# Patient Record
Sex: FEMALE | Race: AMERICAN INDIAN OR ALASKA NATIVE | Employment: FULL TIME | URBAN - METROPOLITAN AREA
[De-identification: names, ages, dates, MRNs, and addresses within clinical notes are randomized per-mention and may not be internally consistent; named-entity substitution may affect disease eponyms.]

---

## 2024-07-22 ENCOUNTER — HOSPITAL ENCOUNTER (EMERGENCY)
Age: 29
Discharge: HOME | End: 2024-07-22
Attending: EMERGENCY MEDICINE
Payer: COMMERCIAL

## 2024-07-22 ENCOUNTER — APPOINTMENT (OUTPATIENT)
Dept: MRI IMAGING | Age: 29
End: 2024-07-22
Payer: COMMERCIAL

## 2024-07-22 VITALS
RESPIRATION RATE: 18 BRPM | HEIGHT: 62 IN | TEMPERATURE: 98.3 F | HEART RATE: 84 BPM | DIASTOLIC BLOOD PRESSURE: 76 MMHG | OXYGEN SATURATION: 98 % | SYSTOLIC BLOOD PRESSURE: 122 MMHG | WEIGHT: 252 LBS | BODY MASS INDEX: 46.38 KG/M2

## 2024-07-22 DIAGNOSIS — M51.26 HERNIATED LUMBAR INTERVERTEBRAL DISC: Primary | ICD-10-CM

## 2024-07-22 DIAGNOSIS — M54.17 LUMBOSACRAL RADICULOPATHY: ICD-10-CM

## 2024-07-22 LAB
ALBUMIN SERPL-MCNC: 4 G/DL (ref 3.5–4.6)
ALP SERPL-CCNC: 46 U/L (ref 40–130)
ALT SERPL-CCNC: 15 U/L (ref 0–33)
ANION GAP SERPL CALCULATED.3IONS-SCNC: 8 MEQ/L (ref 9–15)
AST SERPL-CCNC: 16 U/L (ref 0–35)
BASOPHILS # BLD: 0 K/UL (ref 0–0.2)
BASOPHILS NFR BLD: 0.2 %
BILIRUB SERPL-MCNC: 0.5 MG/DL (ref 0.2–0.7)
BUN SERPL-MCNC: 11 MG/DL (ref 6–20)
CALCIUM SERPL-MCNC: 9 MG/DL (ref 8.5–9.9)
CHLORIDE SERPL-SCNC: 104 MEQ/L (ref 95–107)
CO2 SERPL-SCNC: 25 MEQ/L (ref 20–31)
CREAT SERPL-MCNC: 0.57 MG/DL (ref 0.5–0.9)
CRP SERPL HS-MCNC: <3 MG/L (ref 0–5)
EOSINOPHIL # BLD: 0.1 K/UL (ref 0–0.7)
EOSINOPHIL NFR BLD: 1 %
ERYTHROCYTE [DISTWIDTH] IN BLOOD BY AUTOMATED COUNT: 12 % (ref 11.5–14.5)
ERYTHROCYTE [SEDIMENTATION RATE] IN BLOOD BY WESTERGREN METHOD: 12 MM (ref 0–20)
GLOBULIN SER CALC-MCNC: 2.8 G/DL (ref 2.3–3.5)
GLUCOSE SERPL-MCNC: 87 MG/DL (ref 70–99)
HCG, URINE, POC: NEGATIVE
HCT VFR BLD AUTO: 42.3 % (ref 37–47)
HGB BLD-MCNC: 13.5 G/DL (ref 12–16)
LYMPHOCYTES # BLD: 1.4 K/UL (ref 1–4.8)
LYMPHOCYTES NFR BLD: 27.7 %
Lab: NORMAL
MCH RBC QN AUTO: 29 PG (ref 27–31.3)
MCHC RBC AUTO-ENTMCNC: 31.9 % (ref 33–37)
MCV RBC AUTO: 90.8 FL (ref 79.4–94.8)
MONOCYTES # BLD: 0.4 K/UL (ref 0.2–0.8)
MONOCYTES NFR BLD: 7.1 %
NEGATIVE QC PASS/FAIL: NORMAL
NEUTROPHILS # BLD: 3.2 K/UL (ref 1.4–6.5)
NEUTS SEG NFR BLD: 63.8 %
PLATELET # BLD AUTO: 328 K/UL (ref 130–400)
POSITIVE QC PASS/FAIL: NORMAL
POTASSIUM SERPL-SCNC: 4.2 MEQ/L (ref 3.4–4.9)
PROT SERPL-MCNC: 6.8 G/DL (ref 6.3–8)
RBC # BLD AUTO: 4.66 M/UL (ref 4.2–5.4)
SODIUM SERPL-SCNC: 137 MEQ/L (ref 135–144)
WBC # BLD AUTO: 5.1 K/UL (ref 4.8–10.8)

## 2024-07-22 PROCEDURE — 86140 C-REACTIVE PROTEIN: CPT

## 2024-07-22 PROCEDURE — 85652 RBC SED RATE AUTOMATED: CPT

## 2024-07-22 PROCEDURE — 80053 COMPREHEN METABOLIC PANEL: CPT

## 2024-07-22 PROCEDURE — 99284 EMERGENCY DEPT VISIT MOD MDM: CPT

## 2024-07-22 PROCEDURE — 85025 COMPLETE CBC W/AUTO DIFF WBC: CPT

## 2024-07-22 PROCEDURE — 6360000002 HC RX W HCPCS: Performed by: EMERGENCY MEDICINE

## 2024-07-22 PROCEDURE — 96374 THER/PROPH/DIAG INJ IV PUSH: CPT

## 2024-07-22 PROCEDURE — 36415 COLL VENOUS BLD VENIPUNCTURE: CPT

## 2024-07-22 PROCEDURE — 72148 MRI LUMBAR SPINE W/O DYE: CPT

## 2024-07-22 RX ORDER — PREDNISONE 10 MG/1
60 TABLET ORAL DAILY
Qty: 30 TABLET | Refills: 0 | Status: SHIPPED | OUTPATIENT
Start: 2024-07-22 | End: 2024-07-27

## 2024-07-22 RX ORDER — OXYCODONE HYDROCHLORIDE AND ACETAMINOPHEN 5; 325 MG/1; MG/1
1 TABLET ORAL EVERY 4 HOURS PRN
Qty: 20 TABLET | Refills: 0 | Status: SHIPPED | OUTPATIENT
Start: 2024-07-22 | End: 2024-07-25

## 2024-07-22 RX ORDER — DEXAMETHASONE SODIUM PHOSPHATE 10 MG/ML
10 INJECTION, SOLUTION INTRAMUSCULAR; INTRAVENOUS ONCE
Status: COMPLETED | OUTPATIENT
Start: 2024-07-22 | End: 2024-07-22

## 2024-07-22 RX ADMIN — DEXAMETHASONE SODIUM PHOSPHATE 10 MG: 10 INJECTION, SOLUTION INTRAMUSCULAR; INTRAVENOUS at 13:48

## 2024-07-22 NOTE — ED TRIAGE NOTES
Pt has been seen multiple times for leg numbness and pain. Pt states at this time numbness is entire left foot up to knee. Pt states she is not getting relief with ibuprofen.

## 2024-07-22 NOTE — DISCHARGE INSTRUCTIONS
For any worsening of your condition such as loss of bowel or bladder control numbness or tingling around the vagina or anus or weakness in the left leg or dragging your foot or any other worsening of any of your complaints return immediately to the ER or call 911 for more open treatment.

## 2024-07-22 NOTE — ED PROVIDER NOTES
Performed by ED Physician - none    LABS:  Labs Reviewed   CBC WITH AUTO DIFFERENTIAL - Abnormal; Notable for the following components:       Result Value    MCHC 31.9 (*)     All other components within normal limits   COMPREHENSIVE METABOLIC PANEL - Abnormal; Notable for the following components:    Anion Gap 8 (*)     All other components within normal limits   SEDIMENTATION RATE   C-REACTIVE PROTEIN   POC PREGNANCY UR-QUAL       All other labs were within normal range or not returned as of this dictation.    EMERGENCY DEPARTMENT COURSE and DIFFERENTIAL DIAGNOSIS/MDM:   Vitals:    Vitals:    07/22/24 1302 07/22/24 1312 07/22/24 1332 07/22/24 1337   BP: 123/72  122/76    Pulse:       Resp:       Temp:       TempSrc:       SpO2:  97%  98%   Weight:       Height:               MDM     Amount and/or Complexity of Data Reviewed  Clinical lab tests: reviewed  Tests in the radiology section of CPT®: reviewed  Tests in the medicine section of CPT®: reviewed  Discuss the patient with other providers: yes  Independent visualization of images, tracings, or specimens: yes    I had the patient walking in the room there was no foot drop or weakness noted.  I did talk to Dr. Leo the neurosurgeon and he evaluated the MRI himself and while there was a large broad-based disc herniation given the patient's clinical presentation with no focal neurological deficits it was felt the patient could be trialed as an outpatient with steroids and pain medication.  I discussed all of this with the patient and all were in agreement with the outpatient management and treatment plan.        CRITICAL CARE TIME   Total Critical Care time was  minutes, excluding separately reportableprocedures.  There was a high probability of clinicallysignificant/life threatening deterioration in the patient's condition which required my urgent intervention.      CONSULTS:  None    PROCEDURES:  Unless otherwise noted below, none     Procedures    FINAL

## 2024-07-30 ENCOUNTER — TELEPHONE (OUTPATIENT)
Age: 29
End: 2024-07-30

## 2024-07-30 NOTE — TELEPHONE ENCOUNTER
Patient LVM to inquire about sooner appt. Patient has been added to the wait list in the even a sooner appt is available.

## 2024-08-05 ENCOUNTER — OFFICE VISIT (OUTPATIENT)
Age: 29
End: 2024-08-05
Payer: COMMERCIAL

## 2024-08-05 VITALS — BODY MASS INDEX: 46.93 KG/M2 | TEMPERATURE: 97 F | HEIGHT: 62 IN | WEIGHT: 255 LBS

## 2024-08-05 DIAGNOSIS — M51.26 HERNIATED NUCLEUS PULPOSUS, LUMBAR: ICD-10-CM

## 2024-08-05 DIAGNOSIS — M48.062 SPINAL STENOSIS OF LUMBAR REGION WITH NEUROGENIC CLAUDICATION: Primary | ICD-10-CM

## 2024-08-05 PROCEDURE — 99203 OFFICE O/P NEW LOW 30 MIN: CPT | Performed by: NEUROLOGICAL SURGERY

## 2024-08-05 RX ORDER — METHYLPREDNISOLONE 4 MG/1
TABLET ORAL
Qty: 1 KIT | Refills: 0 | Status: SHIPPED | OUTPATIENT
Start: 2024-08-05 | End: 2024-08-11

## 2024-08-05 RX ORDER — IBUPROFEN 800 MG/1
800 TABLET ORAL 3 TIMES DAILY PRN
COMMUNITY
Start: 2024-07-20 | End: 2024-08-08

## 2024-08-05 RX ORDER — CYCLOBENZAPRINE HCL 5 MG
5 TABLET ORAL EVERY 8 HOURS
COMMUNITY
Start: 2024-07-16 | End: 2024-08-08

## 2024-08-05 ASSESSMENT — ENCOUNTER SYMPTOMS
ABDOMINAL DISTENTION: 0
BACK PAIN: 1
SHORTNESS OF BREATH: 0
EYE PAIN: 0
COUGH: 0
TROUBLE SWALLOWING: 0
ABDOMINAL PAIN: 0

## 2024-08-05 NOTE — PROGRESS NOTES
NEUROSURGERY CONSULT NOTE      Patient Name: Sonia Triana  Patient : 1995  MRN: [id number]       PCP: No primary care provider on file.        History of Present Ilness: 29 y.o. presents in neurosurgical consultation from Dr. Dunn for evaluation lumbar HNP. Started havving left hip pain and BLE pain to knees about 2-3 weeks ago.  This has been associated with some weakness in the legs.  She went to ED and was given NSAID's and muscle relaxants. Then started having numbness from left knee down to foot.  She went to the ED a second time and was given a steroid pack.  She does complain of some weakness both legs, this has had some improvement. Now the pain is gone in legs, numbness and weakness has improved. Worst c/o currently is LBP. No B/B incontinence.  She does tell me she has been working on weight loss and has lost a moderate amount of weight in the last year.    Chief Complaint   Patient presents with    Back Pain          Conservative Treatments:  Physical Therapy: No  NSAID's: Yes  Narcotics: No  Muscle relaxants: Yes  Epidural injections: No      Past Medical History:    No past medical history on file.    Past Surgical History:    No past surgical history on file.    Home Medications:   Prior to Admission medications    Medication Sig Start Date End Date Taking? Authorizing Provider   cyclobenzaprine (FLEXERIL) 5 MG tablet Take 1 tablet by mouth every 8 (eight) hours 24  Yes Provider, MD Long   ibuprofen (ADVIL;MOTRIN) 800 MG tablet Take 1 tablet by mouth 3 times daily as needed for Pain 24  Yes Provider, MD Long           Allergies:   Allergies   Allergen Reactions    Cefdinir Hives and Nausea And Vomiting       Social History:      TOBACCO:   reports that she has never smoked. She has never used smokeless tobacco.  ETOH:   reports no history of alcohol use.  RECREATIONAL DRUG USE:   Social History     Substance and Sexual Activity   Drug Use Never       Family History:

## 2024-08-08 ENCOUNTER — HOSPITAL ENCOUNTER (OUTPATIENT)
Dept: PHYSICAL THERAPY | Age: 29
Setting detail: THERAPIES SERIES
Discharge: HOME | End: 2024-08-08
Attending: NEUROLOGICAL SURGERY
Payer: COMMERCIAL

## 2024-08-08 ENCOUNTER — INITIAL CONSULT (OUTPATIENT)
Dept: PAIN MANAGEMENT | Age: 29
End: 2024-08-08
Payer: COMMERCIAL

## 2024-08-08 VITALS — TEMPERATURE: 97.2 F | HEIGHT: 62 IN | BODY MASS INDEX: 46.93 KG/M2 | WEIGHT: 255 LBS

## 2024-08-08 DIAGNOSIS — M54.16 LUMBAR RADICULOPATHY: Primary | ICD-10-CM

## 2024-08-08 PROCEDURE — 97110 THERAPEUTIC EXERCISES: CPT

## 2024-08-08 PROCEDURE — 99204 OFFICE O/P NEW MOD 45 MIN: CPT | Performed by: PAIN MEDICINE

## 2024-08-08 PROCEDURE — 97162 PT EVAL MOD COMPLEX 30 MIN: CPT

## 2024-08-08 NOTE — PROGRESS NOTES
[facility]  [facility]  P: [phone]  F: [phone]        oSnia Triana  (1995)    8/8/2024    Subjective:     Sonia Triana is 29 y.o. female who complains today of:     Chief Complaint   Patient presents with    Back Pain     Patient here today for initial consultation requested by Dr. Leo.  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  She went to the emergency room eventually had an MRI which shows significant disc herniation toward the left at L4-5 with significant stenosis.  She saw Dr. Leo who opted for conservative treatment based on risks and benefit ratio in regards to patient's pathology and weight.  He would like to try conservative treatment.  Patient is on steroids they help a little bit she tried muscle relaxers.  Pain is in low back going down the left leg with numbness some weakness.  Patient has not had back surgery.  She is not diabetic she is not on blood thinners.  She does not smoke.  Standing is the worst laying is the best.  The pain affects her quality life.    Assessment: L4-5 disc herniation with significant radiculopathy and stenosis    Plan: We discussed options.  I would like to authorize left L4-5 transforaminal epidural injection under fluoroscopic guidance.  Patient has failed consider treatment.  She has seen neurosurgery.  She is can start therapy.  OARRS is reviewed.  She does not need any narcotics at this point she is on steroids.  Questions answered chart reviewed she is in agreement with plan.  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    Allergies:  Cefdinir    No past medical history on file.  No past surgical history on file.  No family history on file.  Social History     Socioeconomic History    Marital

## 2024-08-08 NOTE — PLAN OF CARE
Physical Therapy Evaluation/Plan of Care   [facility]  [address]  [facility] [address]  Dept: [phone]  Dept Fax: [phone]  Loc: [phone]    Physical Therapy: Initial Evaluation    General Information    Patient: Sonia Triana (29 y.o.     female)   Examination Date: 2024   :  1995 ;    Confirmed: Yes MRN: [id number]  CSN: [id number]   Insurance: Payor: BC / Plan: BCBS - [place] PPO / Product Type: *No Product type* /   Insurance ID: [id number] - ([facility])  PT Insurance Information: [facility] Secondary Insurance (if applicable):     Referring Physician: Kevan Leo MD       Visits to Date/Visits Approved:     No Show/Cancelled Appts: 0      Medical Diagnosis: Spinal stenosis of lumbar region with neurogenic claudication [M48.062] Spinal stenosis of lumbar region with neurogenic claudication  Diagnosis: Spinal stenosis of lumbar region with neurogenic claudication   Treatment Diagnosis: back pain, Rt LE radiating pain, postural asymmetry, impaired functional mobility      SUBJECTIVE:     Onset date:   Onset Date: 07/15/24    Subjective/ Mechanism of Injury:   Subjective: Pt reports sudden onset of severe back pain and Lt LE radiating pain while dancing in show at [employer]. Pt states went to ER and received muscle relaxer with no sig improvement. Continued to work with progressive worsening of symptoms to the point of numbness Lt LE distal to knee. Pt went back to ER and referred to neuro and taking anti-inflammatories. Continued worsening of symptoms with working. Pt went to different ER and MRI revealed large central disc extrusion at L4/L5 with severe central canal stenosis. Given pain meds, steroids with some relief. Pt now reports numbness has improved, but persists lateral lower leg into foot and toes. 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 Waiting

## 2024-08-12 ENCOUNTER — TELEPHONE (OUTPATIENT)
Dept: PAIN MANAGEMENT | Age: 29
End: 2024-08-12

## 2024-08-12 NOTE — TELEPHONE ENCOUNTER
[facility] FOR PT TO CALL BACK AND SCHEDULE     LEFT L4-5 TRANSFORAMINAL      AUTH APPROVED FROM 8/13/24-9/11/24  REFERRAL # [id number]           TO BE SCHEDULED WITH DR. BROWN

## 2024-08-12 NOTE — TELEPHONE ENCOUNTER
LEFT L4-5 TRANSFORAMINAL     AUTH APPROVED FROM 8/13/24-9/11/24  REFERRAL # [id number]  OK to schedule procedure approved as above.   Please note sides/levels approved and date range.   (If applicable, sides/levels approved may differ from those ordered)    TO BE SCHEDULED WITH

## 2024-08-13 ENCOUNTER — HOSPITAL ENCOUNTER (OUTPATIENT)
Dept: PHYSICAL THERAPY | Age: 29
Setting detail: THERAPIES SERIES
Discharge: HOME | End: 2024-08-13
Attending: NEUROLOGICAL SURGERY
Payer: COMMERCIAL

## 2024-08-13 PROCEDURE — G0283 ELEC STIM OTHER THAN WOUND: HCPCS

## 2024-08-13 PROCEDURE — 97110 THERAPEUTIC EXERCISES: CPT

## 2024-08-13 ASSESSMENT — PAIN DESCRIPTION - DESCRIPTORS: DESCRIPTORS: ACHING

## 2024-08-13 ASSESSMENT — PAIN DESCRIPTION - ORIENTATION: ORIENTATION: LEFT;RIGHT

## 2024-08-13 ASSESSMENT — PAIN DESCRIPTION - LOCATION: LOCATION: BACK;HIP

## 2024-08-13 ASSESSMENT — PAIN SCALES - GENERAL: PAINLEVEL_OUTOF10: 5

## 2024-08-13 NOTE — PROGRESS NOTES
[facility]  Outpatient Physical Therapy    Treatment Note        Date: 2024  Patient: Sonia Triana  : 1995   Confirmed: Yes  MRN: [id number]  Referring Provider: eKvan Leo MD   Secondary Referring Provider (If applicable):     Medical Diagnosis: Spinal stenosis of lumbar region with neurogenic claudication [M48.062]    Treatment Diagnosis: back pain, Rt LE radiating pain, postural asymmetry, impaired functional mobility    Visit Information:  Insurance: Payor: [facility] / Plan: BCBS - [place] PPO / Product Type: *No Product type* /   PT Visit Information  Onset Date: 07/15/24  PT Insurance Information: BCBS  Total # of Visits Approved: 25  Total # of Visits to Date: 2  No Show: 0  Canceled Appointment: 0  Progress Note Counter:     Subjective Information:  Subjective: Pt reports feeling better over past week.  Pt reports injections scheduled for Aug 19th.  HEP Compliance:  [x] Good [] Fair [] Poor [] Reports not doing due to:    Pain Screening  Patient Currently in Pain: Yes  Pain Assessment: 0-10  Pain Level: 5  Pain Location: Back, Hip  Pain Orientation: Left, Right  Pain Descriptors: Aching    Treatment:  Exercises:  Exercises  Exercise 1: LTR x10  Exercise 2: supine pelvic tilt 5 sec/ 10  Exercise 3: diaphragmatic breathing x5 with good technique in supine  Exercise 4: clamshell, sidelying hip abd x10  Exercise 5: core strength: iso TA 5 sec/10, iso TA w/ march x10  Exercise 6: bridges x10  Exercise 7: * DLS  Exercise 8: Nu step L1 x5 min  Exercise 9: hams str 3/30 sec  Exercise 10: * lumbar ROM  Exercise 11: prone lay 2 min  Exercise 12: * sink exs  Exercise 20: HEP: HS stretch  Treatment Reasoning  Limitations addressed: Mobility, Strength, Flexibility    Manual:   Manual Therapy  Manual Traction: * leg pulls  Soft Tissue Mobilizaton: STM to lumbar paraspinals 5 min       Modalities:  Moist Heat (CPT 47650)  Patient Position: Prone  Moist heat location: Low

## 2024-08-15 ENCOUNTER — HOSPITAL ENCOUNTER (OUTPATIENT)
Dept: PHYSICAL THERAPY | Age: 29
Setting detail: THERAPIES SERIES
Discharge: HOME | End: 2024-08-15
Attending: NEUROLOGICAL SURGERY
Payer: COMMERCIAL

## 2024-08-15 PROCEDURE — 97110 THERAPEUTIC EXERCISES: CPT

## 2024-08-15 PROCEDURE — G0283 ELEC STIM OTHER THAN WOUND: HCPCS

## 2024-08-15 ASSESSMENT — PAIN DESCRIPTION - DESCRIPTORS: DESCRIPTORS: SORE

## 2024-08-15 ASSESSMENT — PAIN DESCRIPTION - ORIENTATION: ORIENTATION: LEFT;RIGHT

## 2024-08-15 ASSESSMENT — PAIN SCALES - GENERAL: PAINLEVEL_OUTOF10: 4

## 2024-08-15 ASSESSMENT — PAIN DESCRIPTION - LOCATION: LOCATION: BACK;HIP

## 2024-08-15 NOTE — PROGRESS NOTES
[facility]  Outpatient Physical Therapy    Treatment Note        Date: 8/15/2024  Patient: Sonia Triana  : 1995   Confirmed: Yes  MRN: [id number]  Referring Provider: Kevan Leo MD   Secondary Referring Provider (If applicable):     Medical Diagnosis: Spinal stenosis of lumbar region with neurogenic claudication [M48.062]    Treatment Diagnosis: back pain, Rt LE radiating pain, postural asymmetry, impaired functional mobility    Visit Information:  Insurance: Payor: [facility] / Plan: BCBS - [place] PPO / Product Type: *No Product type* /   PT Visit Information  Onset Date: 07/15/24  PT Insurance Information: BCBS  Total # of Visits Approved: 25  Total # of Visits to Date: 3  No Show: 0  Canceled Appointment: 0  Progress Note Counter: 3/12    Subjective Information:  Subjective: 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  Pt reports no increase in LBP while doing shows.  HEP Compliance:  [x] Good [] Fair [] Poor [] Reports not doing due to:    Pain Screening  Patient Currently in Pain: Yes  Pain Assessment: 0-10  Pain Level: 4  Pain Location: Back, Hip  Pain Orientation: Left, Right  Pain Descriptors: Sore    Treatment:  Exercises:  Exercises  Exercise 1: LTR x10  Exercise 2: seated pelvic tilts x10  Exercise 3: SLR x10 (pain with Rt LE)  Exercise 5: core strength: walkouts, bent knee fallout, dead bug x10  Exercise 6: bridges x10  Exercise 7: * DLS  Exercise 8: Nu step L1 x5 min  Exercise 9: hams str 3/30 sec  Exercise 10: * lumbar ROM  Exercise 11: prone prop x2 min  Exercise 12: * sink exs  Exercise 20: HEP: brindge, march, bent knee fallout  Treatment Reasoning  Limitations addressed: Mobility, Strength, Flexibility    Manual:   Manual Therapy  Manual Traction: * leg pulls  Soft Tissue Mobilizaton: STM to lumbar paraspinals 5 min       Modalities:  Moist Heat (CPT 91146)  Patient Position: Prone  Moist heat location: Low

## 2024-08-19 ENCOUNTER — PROCEDURE VISIT (OUTPATIENT)
Age: 29
End: 2024-08-19
Payer: COMMERCIAL

## 2024-08-19 ENCOUNTER — APPOINTMENT (OUTPATIENT)
Dept: PHYSICAL THERAPY | Age: 29
End: 2024-08-19
Attending: NEUROLOGICAL SURGERY
Payer: COMMERCIAL

## 2024-08-19 VITALS
SYSTOLIC BLOOD PRESSURE: 104 MMHG | HEIGHT: 62 IN | BODY MASS INDEX: 46.93 KG/M2 | OXYGEN SATURATION: 99 % | WEIGHT: 255 LBS | TEMPERATURE: 98.1 F | HEART RATE: 87 BPM | DIASTOLIC BLOOD PRESSURE: 70 MMHG

## 2024-08-19 DIAGNOSIS — M54.16 LUMBAR RADICULOPATHY: Primary | ICD-10-CM

## 2024-08-19 PROCEDURE — 64483 NJX AA&/STRD TFRM EPI L/S 1: CPT | Performed by: PAIN MEDICINE

## 2024-08-19 RX ORDER — DEXAMETHASONE SODIUM PHOSPHATE 10 MG/ML
10 INJECTION, SOLUTION INTRAMUSCULAR; INTRAVENOUS ONCE
Status: COMPLETED | OUTPATIENT
Start: 2024-08-19 | End: 2024-08-19

## 2024-08-19 RX ADMIN — DEXAMETHASONE SODIUM PHOSPHATE 10 MG: 10 INJECTION, SOLUTION INTRAMUSCULAR; INTRAVENOUS at 09:33

## 2024-08-19 NOTE — PROGRESS NOTES
[employer]  Spine Surgery  Advanced Pain Management      Patient Name: Sonia Triana : 1995  Date: 2024   Physician: HUONG BROWN DO      Sonia Triana  is here today for interventional pain management.    Preoperatively, the patient presents with radicular pain in the affected area as per history and exam. Patient has failed NSAIDs, PT, and conservative treatment. Patient has significant psychological and functional impairment due to this condition.  Standard ASIPP guidelines were followed and sterile technique used.  Area was cleaned with Betadine x3.  Informed consent was obtained.  Fluoroscopic guidance was used for this procedure.    TRANSFORAMINAL EPIDURAL  There was appropriate spread of contrast in the anterior epidural space and around the exiting nerve root. The 6 o’clock position of the pedicle was identified. Multiple views of fluoroscopy including lateral were used to confirm accurate needle placement of depth. Live fluoroscopy was used when injecting contrast to ensure no subdural or vascular spread. In total, approximately 5 mg of Dexamethasone and 1.0 ml of 0.9cc of normal saline was injected slowly without difficulty.This procedure was 60% more difficult and required 60% more work secondary to the patient's habitus. The patient has a BMI of 47 and has comorbidities of severe obesity. This required increased work for safe and proper positioning upon the fluoroscopy table, increased needle passes for safe and appropriate needle placement, and increased fluoroscopy time and radiation exposure for proper visualization.        Patient tolerated the procedure well, no obvious complications occurred during the procedure.  Patient was appropriately monitored and discharged home in stable condition with their usual motor strength. Post Op instructions were given to patient. Patient will resume blood thinners after procedure if they stopped them before procedure. Relevant imaging

## 2024-08-20 ENCOUNTER — HOSPITAL ENCOUNTER (OUTPATIENT)
Dept: PHYSICAL THERAPY | Age: 29
Setting detail: THERAPIES SERIES
Discharge: HOME | End: 2024-08-20
Attending: NEUROLOGICAL SURGERY
Payer: COMMERCIAL

## 2024-08-20 PROCEDURE — 97110 THERAPEUTIC EXERCISES: CPT

## 2024-08-20 ASSESSMENT — PAIN DESCRIPTION - PAIN TYPE: TYPE: ACUTE PAIN

## 2024-08-20 ASSESSMENT — PAIN DESCRIPTION - ORIENTATION: ORIENTATION: LOWER;RIGHT

## 2024-08-20 ASSESSMENT — PAIN DESCRIPTION - LOCATION: LOCATION: BACK;LEG

## 2024-08-20 ASSESSMENT — PAIN SCALES - GENERAL: PAINLEVEL_OUTOF10: 3

## 2024-08-20 NOTE — PROGRESS NOTES
[facility]  Outpatient Physical Therapy    Treatment Note        Date: 2024  Patient: Sonia Traina  : 1995   Confirmed: Yes  MRN: [id number]  Referring Provider: Kevan Leo MD   Secondary Referring Provider (If applicable):     Medical Diagnosis: Spinal stenosis of lumbar region with neurogenic claudication [M48.062]    Treatment Diagnosis: back pain, Rt LE radiating pain, postural asymmetry, impaired functional mobility    Visit Information:  Insurance: Payor: [facility] / Plan: BCBS - [place] PPO / Product Type: *No Product type* /   PT Visit Information  Onset Date: 07/15/24  PT Insurance Information: BCBS  Total # of Visits Approved: 25  Total # of Visits to Date: 4  No Show: 0  Canceled Appointment: 0  Progress Note Counter:     Subjective Information:  Subjective: Patient reports she received low back injections yesterday. Feels the intensity of numbness and tingling in her decreased. C/o HA and tiredness since procedure yesterday.  HEP Compliance:  [x] Good [] Fair [] Poor [] Reports not doing due to:    Treatment:  Exercises:  Exercises  Exercise 1: LTR x10  Exercise 2: seated pelvic tilts w ay on Pball  x10  Exercise 5: core strength: walkouts, bent knee fallout, dead bug x15  Exercise 6: bridges x15  Exercise 9: hams str 3/30 sec seated, gastroc stretch 3x30\"  Exercise 11: prone prop x3 min  Exercise 20: HEP: bridge, march, bent knee fallout    Assessment:   Body Structures, Functions, Activity Limitations Requiring Skilled Therapeutic Intervention: Decreased functional mobility , Decreased ROM, Decreased strength, Decreased sensation, Increased pain, Decreased posture  Assessment: Continued current POc with focus on core stability and lumbar mobility to decreased pain with work duties. Patient initially reports increased radicular pain with prone prop however symptoms decreased after 30 seconds in position. Held manual/modalities this date to allow patient

## 2024-08-21 ENCOUNTER — HOSPITAL ENCOUNTER (OUTPATIENT)
Dept: PHYSICAL THERAPY | Age: 29
Setting detail: THERAPIES SERIES
Discharge: HOME | End: 2024-08-21
Attending: NEUROLOGICAL SURGERY
Payer: COMMERCIAL

## 2024-08-21 PROCEDURE — G0283 ELEC STIM OTHER THAN WOUND: HCPCS

## 2024-08-21 PROCEDURE — 97110 THERAPEUTIC EXERCISES: CPT

## 2024-08-21 ASSESSMENT — PAIN DESCRIPTION - ORIENTATION: ORIENTATION: LOWER

## 2024-08-21 ASSESSMENT — PAIN DESCRIPTION - PAIN TYPE: TYPE: ACUTE PAIN

## 2024-08-21 ASSESSMENT — PAIN SCALES - GENERAL: PAINLEVEL_OUTOF10: 2

## 2024-08-21 ASSESSMENT — PAIN DESCRIPTION - LOCATION: LOCATION: BACK;LEG

## 2024-08-21 NOTE — PROGRESS NOTES
[facility]  Outpatient Physical Therapy    Treatment Note        Date: 2024  Patient: Sonia Triana  : 1995   Confirmed: Yes  MRN: [id number]  Referring Provider: Kevan Leo MD   Secondary Referring Provider (If applicable):     Medical Diagnosis: Spinal stenosis of lumbar region with neurogenic claudication [M48.062]    Treatment Diagnosis: back pain, Rt LE radiating pain, postural asymmetry, impaired functional mobility    Visit Information:  Insurance: Payor: BCBS / Plan: BCBS [place]PO / Product Type: *No Product type* /   PT Visit Information  Onset Date: 07/15/24  PT Insurance Information: BCBS  Total # of Visits Approved: 25  Total # of Visits to Date: 5  No Show: 0  Canceled Appointment: 0  Progress Note Counter:     Subjective Information:  Subjective: Pt states \"The injection that I had on Monday really seemed to help. I was just here yesterday so I'm a little sore.\"  HEP Compliance:  [x] Good [] Fair [] Poor [] Reports not doing due to:    Pain Screening  Patient Currently in Pain: Yes  Pain Assessment: 0-10  Pain Level: 2  Pain Type: Acute pain  Pain Location: Back, Leg  Pain Orientation: Lower    Treatment:  Exercises:  Exercises  Exercise 1: LTR x10  Exercise 2: seated pelvic tilts w ay on Pball  x10  Exercise 4: clamshell, sidelying hip abd x10  Exercise 5: core strength: walkouts, bent knee fallout, dead bug x15; paloff press x10 b/l BTB  Exercise 6: bridges x15  Exercise 7: * DLS  Exercise 8: Nu step L2 x5 min  Exercise 9: hams str 3/30 sec seated, gastroc stretch 3x30\"  Exercise 10: lumbar ROM: 3 way lumbar flexion over PBall, seated 10\"x3 ea  Exercise 11: prone prop x3 min, standing exts x10  Exercise 20: HEP:    Modalities:  Moist Heat (CPT 35818)  Patient Position: Prone  Moist heat location: Low back  Limitations addressed: Pain modulation, Tissue extensibility  Electric stimulation, unattended (CPT 77221) /  (Medicare)  Patient

## 2024-09-04 ENCOUNTER — HOSPITAL ENCOUNTER (OUTPATIENT)
Dept: PHYSICAL THERAPY | Age: 29
Setting detail: THERAPIES SERIES
Discharge: HOME | End: 2024-09-04
Attending: NEUROLOGICAL SURGERY
Payer: COMMERCIAL

## 2024-09-04 PROCEDURE — 97110 THERAPEUTIC EXERCISES: CPT

## 2024-09-04 PROCEDURE — G0283 ELEC STIM OTHER THAN WOUND: HCPCS

## 2024-09-04 NOTE — PROGRESS NOTES
LTG 1 Improve LE strength >/= 4+/5 to improve walking tolerance In progress   LTG 2 ambulate independently without AD 1500 ft with improved symmetrical stance, step length and upright posture </= 3/10 pain In progress   LTG 3 Oswestry </= 15 to demonstrate improved activity tolerance In progress   LTG 4 Improve core strength >/= 4/5 to decrease back pain In progress   LTG 5 Return sensation to WFL with 2 pt discrimination with 90% accuracy In progress            Plan:  Frequency/Duration:  Plan  Plan Frequency: 2x/ wk  Plan weeks: 6 wks  Current Treatment Recommendations: Strengthening, ROM, Balance training, Functional mobility training, Gait training, Pain management, Safety education & training, Home exercise program, Patient/Caregiver education & training, Equipment evaluation, education, & procurement, Modalities, Group Therapy  Modalities: Heat/Cold, E-stim - unattended  Pt to continue current HEP.  See objective section for any therapeutic exercise changes, additions or modifications this date.    Therapy Time:      PT Individual Minutes  Time In: 1500  Time Out: 1554  Minutes: 54  Timed Code Treatment Minutes: 44 Minutes  Procedure Minutes: 10 e-stim and HP  Timed Activity Minutes Units   Ther Ex  44  3     Electronically signed by Corrina Alaniz PTA on 9/4/24 at 3:57 PM EDT

## 2024-09-10 ENCOUNTER — HOSPITAL ENCOUNTER (OUTPATIENT)
Dept: PHYSICAL THERAPY | Age: 29
Setting detail: THERAPIES SERIES
Discharge: HOME | End: 2024-09-10
Attending: NEUROLOGICAL SURGERY
Payer: COMMERCIAL

## 2024-09-10 PROCEDURE — 97110 THERAPEUTIC EXERCISES: CPT

## 2024-09-10 PROCEDURE — G0283 ELEC STIM OTHER THAN WOUND: HCPCS

## 2024-09-10 ASSESSMENT — PAIN SCALES - GENERAL: PAINLEVEL_OUTOF10: 3

## 2024-09-10 ASSESSMENT — PAIN DESCRIPTION - LOCATION: LOCATION: BACK

## 2024-09-10 ASSESSMENT — PAIN DESCRIPTION - DESCRIPTORS: DESCRIPTORS: SORE

## 2024-09-10 ASSESSMENT — PAIN DESCRIPTION - PAIN TYPE: TYPE: ACUTE PAIN

## 2024-09-10 ASSESSMENT — PAIN DESCRIPTION - ORIENTATION: ORIENTATION: LOWER

## 2024-09-19 ENCOUNTER — HOSPITAL ENCOUNTER (OUTPATIENT)
Dept: PHYSICAL THERAPY | Age: 29
Setting detail: THERAPIES SERIES
Discharge: HOME | End: 2024-09-19
Attending: NEUROLOGICAL SURGERY
Payer: COMMERCIAL

## 2024-09-19 PROCEDURE — 97110 THERAPEUTIC EXERCISES: CPT
